# Patient Record
Sex: MALE | Race: WHITE | NOT HISPANIC OR LATINO | URBAN - METROPOLITAN AREA
[De-identification: names, ages, dates, MRNs, and addresses within clinical notes are randomized per-mention and may not be internally consistent; named-entity substitution may affect disease eponyms.]

---

## 2018-05-22 ENCOUNTER — APPOINTMENT (RX ONLY)
Dept: URBAN - METROPOLITAN AREA CLINIC 156 | Facility: CLINIC | Age: 33
Setting detail: DERMATOLOGY
End: 2018-05-22

## 2018-05-22 VITALS
HEIGHT: 72 IN | SYSTOLIC BLOOD PRESSURE: 149 MMHG | WEIGHT: 175 LBS | HEART RATE: 65 BPM | DIASTOLIC BLOOD PRESSURE: 99 MMHG

## 2018-05-22 DIAGNOSIS — D22 MELANOCYTIC NEVI: ICD-10-CM

## 2018-05-22 PROBLEM — D22.5 MELANOCYTIC NEVI OF TRUNK: Status: ACTIVE | Noted: 2018-05-22

## 2018-05-22 PROCEDURE — ? EDUCATIONAL RESOURCES PROVIDED

## 2018-05-22 PROCEDURE — 99202 OFFICE O/P NEW SF 15 MIN: CPT

## 2018-05-22 PROCEDURE — ? COUNSELING

## 2018-05-22 ASSESSMENT — LOCATION DETAILED DESCRIPTION DERM: LOCATION DETAILED: INFERIOR THORACIC SPINE

## 2018-05-22 ASSESSMENT — LOCATION SIMPLE DESCRIPTION DERM: LOCATION SIMPLE: UPPER BACK

## 2018-05-22 ASSESSMENT — LOCATION ZONE DERM: LOCATION ZONE: TRUNK

## 2018-05-22 NOTE — HPI: SKIN LESIONS
How Severe Is Your Skin Lesion?: mild
Have Your Skin Lesions Been Treated?: not been treated
Is This A New Presentation, Or A Follow-Up?: Skin Lesions
Additional History: Grandmother  from Melanoma age 36.
Which Family Member (Optional)?: Grandmother